# Patient Record
Sex: MALE | Race: OTHER | HISPANIC OR LATINO | URBAN - METROPOLITAN AREA
[De-identification: names, ages, dates, MRNs, and addresses within clinical notes are randomized per-mention and may not be internally consistent; named-entity substitution may affect disease eponyms.]

---

## 2021-04-20 ENCOUNTER — EMERGENCY (EMERGENCY)
Facility: HOSPITAL | Age: 51
LOS: 1 days | Discharge: HOME | End: 2021-04-20
Attending: EMERGENCY MEDICINE
Payer: COMMERCIAL

## 2021-04-20 VITALS
DIASTOLIC BLOOD PRESSURE: 91 MMHG | RESPIRATION RATE: 18 BRPM | HEART RATE: 74 BPM | OXYGEN SATURATION: 99 % | SYSTOLIC BLOOD PRESSURE: 133 MMHG | TEMPERATURE: 98 F

## 2021-04-20 VITALS
SYSTOLIC BLOOD PRESSURE: 152 MMHG | OXYGEN SATURATION: 100 % | DIASTOLIC BLOOD PRESSURE: 103 MMHG | HEART RATE: 103 BPM | TEMPERATURE: 98 F | RESPIRATION RATE: 20 BRPM | HEIGHT: 65 IN

## 2021-04-20 DIAGNOSIS — Z93.8 OTHER ARTIFICIAL OPENING STATUS: Chronic | ICD-10-CM

## 2021-04-20 DIAGNOSIS — Z98.890 OTHER SPECIFIED POSTPROCEDURAL STATES: Chronic | ICD-10-CM

## 2021-04-20 LAB
ALBUMIN SERPL ELPH-MCNC: 4.3 G/DL — SIGNIFICANT CHANGE UP (ref 3.3–5.2)
ALP SERPL-CCNC: 120 U/L — SIGNIFICANT CHANGE UP (ref 40–120)
ALT FLD-CCNC: 28 U/L — SIGNIFICANT CHANGE UP
ANION GAP SERPL CALC-SCNC: 11 MMOL/L — SIGNIFICANT CHANGE UP (ref 5–17)
APTT BLD: 33 SEC — SIGNIFICANT CHANGE UP (ref 27.5–35.5)
AST SERPL-CCNC: 20 U/L — SIGNIFICANT CHANGE UP
BASOPHILS # BLD AUTO: 0.04 K/UL — SIGNIFICANT CHANGE UP (ref 0–0.2)
BASOPHILS NFR BLD AUTO: 0.5 % — SIGNIFICANT CHANGE UP (ref 0–2)
BILIRUB SERPL-MCNC: 0.3 MG/DL — LOW (ref 0.4–2)
BUN SERPL-MCNC: 11 MG/DL — SIGNIFICANT CHANGE UP (ref 8–20)
CALCIUM SERPL-MCNC: 9.2 MG/DL — SIGNIFICANT CHANGE UP (ref 8.6–10.2)
CHLORIDE SERPL-SCNC: 102 MMOL/L — SIGNIFICANT CHANGE UP (ref 98–107)
CO2 SERPL-SCNC: 26 MMOL/L — SIGNIFICANT CHANGE UP (ref 22–29)
CREAT SERPL-MCNC: 1.02 MG/DL — SIGNIFICANT CHANGE UP (ref 0.5–1.3)
EOSINOPHIL # BLD AUTO: 0.13 K/UL — SIGNIFICANT CHANGE UP (ref 0–0.5)
EOSINOPHIL NFR BLD AUTO: 1.5 % — SIGNIFICANT CHANGE UP (ref 0–6)
GLUCOSE SERPL-MCNC: 114 MG/DL — HIGH (ref 70–99)
HCT VFR BLD CALC: 47.6 % — SIGNIFICANT CHANGE UP (ref 39–50)
HGB BLD-MCNC: 16.4 G/DL — SIGNIFICANT CHANGE UP (ref 13–17)
IMM GRANULOCYTES NFR BLD AUTO: 1.2 % — SIGNIFICANT CHANGE UP (ref 0–1.5)
INR BLD: 1.09 RATIO — SIGNIFICANT CHANGE UP (ref 0.88–1.16)
LYMPHOCYTES # BLD AUTO: 2.57 K/UL — SIGNIFICANT CHANGE UP (ref 1–3.3)
LYMPHOCYTES # BLD AUTO: 30.5 % — SIGNIFICANT CHANGE UP (ref 13–44)
MCHC RBC-ENTMCNC: 29.9 PG — SIGNIFICANT CHANGE UP (ref 27–34)
MCHC RBC-ENTMCNC: 34.5 GM/DL — SIGNIFICANT CHANGE UP (ref 32–36)
MCV RBC AUTO: 86.9 FL — SIGNIFICANT CHANGE UP (ref 80–100)
MONOCYTES # BLD AUTO: 0.73 K/UL — SIGNIFICANT CHANGE UP (ref 0–0.9)
MONOCYTES NFR BLD AUTO: 8.7 % — SIGNIFICANT CHANGE UP (ref 2–14)
NEUTROPHILS # BLD AUTO: 4.86 K/UL — SIGNIFICANT CHANGE UP (ref 1.8–7.4)
NEUTROPHILS NFR BLD AUTO: 57.6 % — SIGNIFICANT CHANGE UP (ref 43–77)
PLATELET # BLD AUTO: 260 K/UL — SIGNIFICANT CHANGE UP (ref 150–400)
POTASSIUM SERPL-MCNC: 3.8 MMOL/L — SIGNIFICANT CHANGE UP (ref 3.5–5.3)
POTASSIUM SERPL-SCNC: 3.8 MMOL/L — SIGNIFICANT CHANGE UP (ref 3.5–5.3)
PROT SERPL-MCNC: 7.7 G/DL — SIGNIFICANT CHANGE UP (ref 6.6–8.7)
PROTHROM AB SERPL-ACNC: 12.6 SEC — SIGNIFICANT CHANGE UP (ref 10.6–13.6)
RBC # BLD: 5.48 M/UL — SIGNIFICANT CHANGE UP (ref 4.2–5.8)
RBC # FLD: 12.5 % — SIGNIFICANT CHANGE UP (ref 10.3–14.5)
SODIUM SERPL-SCNC: 139 MMOL/L — SIGNIFICANT CHANGE UP (ref 135–145)
TROPONIN T SERPL-MCNC: <0.01 NG/ML — SIGNIFICANT CHANGE UP (ref 0–0.06)
WBC # BLD: 8.43 K/UL — SIGNIFICANT CHANGE UP (ref 3.8–10.5)
WBC # FLD AUTO: 8.43 K/UL — SIGNIFICANT CHANGE UP (ref 3.8–10.5)

## 2021-04-20 PROCEDURE — 0042T: CPT

## 2021-04-20 PROCEDURE — 70498 CT ANGIOGRAPHY NECK: CPT | Mod: 26,MA

## 2021-04-20 PROCEDURE — 85730 THROMBOPLASTIN TIME PARTIAL: CPT

## 2021-04-20 PROCEDURE — 70496 CT ANGIOGRAPHY HEAD: CPT

## 2021-04-20 PROCEDURE — 82962 GLUCOSE BLOOD TEST: CPT

## 2021-04-20 PROCEDURE — 70450 CT HEAD/BRAIN W/O DYE: CPT

## 2021-04-20 PROCEDURE — 96374 THER/PROPH/DIAG INJ IV PUSH: CPT | Mod: XU

## 2021-04-20 PROCEDURE — 93010 ELECTROCARDIOGRAM REPORT: CPT

## 2021-04-20 PROCEDURE — 85025 COMPLETE CBC W/AUTO DIFF WBC: CPT

## 2021-04-20 PROCEDURE — 70450 CT HEAD/BRAIN W/O DYE: CPT | Mod: 26,59,MA

## 2021-04-20 PROCEDURE — 36415 COLL VENOUS BLD VENIPUNCTURE: CPT

## 2021-04-20 PROCEDURE — 99285 EMERGENCY DEPT VISIT HI MDM: CPT

## 2021-04-20 PROCEDURE — 85610 PROTHROMBIN TIME: CPT

## 2021-04-20 PROCEDURE — 80053 COMPREHEN METABOLIC PANEL: CPT

## 2021-04-20 PROCEDURE — 70498 CT ANGIOGRAPHY NECK: CPT

## 2021-04-20 PROCEDURE — 99285 EMERGENCY DEPT VISIT HI MDM: CPT | Mod: 25

## 2021-04-20 PROCEDURE — 70496 CT ANGIOGRAPHY HEAD: CPT | Mod: 26,59,MA

## 2021-04-20 PROCEDURE — 93005 ELECTROCARDIOGRAM TRACING: CPT

## 2021-04-20 PROCEDURE — 84484 ASSAY OF TROPONIN QUANT: CPT

## 2021-04-20 RX ORDER — METHOCARBAMOL 500 MG/1
2 TABLET, FILM COATED ORAL
Qty: 20 | Refills: 0
Start: 2021-04-20

## 2021-04-20 RX ORDER — METHOCARBAMOL 500 MG/1
1500 TABLET, FILM COATED ORAL ONCE
Refills: 0 | Status: COMPLETED | OUTPATIENT
Start: 2021-04-20 | End: 2021-04-20

## 2021-04-20 RX ORDER — IBUPROFEN 200 MG
1 TABLET ORAL
Qty: 20 | Refills: 0
Start: 2021-04-20

## 2021-04-20 RX ORDER — KETOROLAC TROMETHAMINE 30 MG/ML
15 SYRINGE (ML) INJECTION ONCE
Refills: 0 | Status: DISCONTINUED | OUTPATIENT
Start: 2021-04-20 | End: 2021-04-20

## 2021-04-20 RX ADMIN — Medication 15 MILLIGRAM(S): at 02:53

## 2021-04-20 RX ADMIN — METHOCARBAMOL 1500 MILLIGRAM(S): 500 TABLET, FILM COATED ORAL at 02:53

## 2021-04-20 NOTE — ED ADULT TRIAGE NOTE - CHIEF COMPLAINT QUOTE
pt arrived c/o headache and left arm weakness. states this started around two hours ago when he was leaving [employer]. pt is aao x4. is moving all extremities with equal strength but states "it still feels weak" denies dizziness, blurry vision, chest pain sob. MD called to bedside. code stroke activated

## 2021-04-20 NOTE — ED PROVIDER NOTE - NS ED ROS FT
Constitutional: no fever, no chills  Eyes: no vision changes  ENT: no nasal congestion, no sore throat  CV: no chest pain  Resp: no cough, no shortness of breath  GI: no abdominal pain, no vomiting, no diarrhea  : no dysuria  MSK: no joint pain  Skin: no rash  Neuro: +headache, no weakness, +paresthesias

## 2021-04-20 NOTE — ED ADULT NURSE NOTE - CHIEF COMPLAINT QUOTE
pt arrived c/o headache and left arm weakness. states this started around two hours ago when he was leaving [employer]. pt is aao x4. is moving all extremities with equal strength but states "it still feels weak" denies dizziness, blurry vision, chest pain sob. MD called to bedside. code stroke activated

## 2021-04-20 NOTE — ED PROVIDER NOTE - PATIENT PORTAL LINK FT
You can access the FollowMyHealth Patient Portal offered by [facility] by registering at the following website: [email or web address]. By joining [employer]’s FollowMyHealth portal, you will also be able to view your health information using other applications (apps) compatible with our system.

## 2021-04-20 NOTE — ED PROVIDER NOTE - PHYSICAL EXAMINATION
Constitutional: Awake, alert, in no acute distress  Eyes: no scleral icterus  HENT: normocephalic, atraumatic, moist oral mucosa  Neck: supple  CV: RRR, no murmur  Pulm: non-labored respirations, CTAB  Abdomen: soft, non-tender, non-distended  Extremities: no edema, no deformity  Skin: no rash, no jaundice  Neuro: AAOx3, CNs II-XII intact, no facial asymmetry, 5/5 strength in all extremities, +mildly decreased sensation LUE, no finger-to-nose dysmetria.  NIHSS = 1. Constitutional: Awake, alert, in no acute distress.  Symptoms exacerbated when turning head to the right.  Eyes: no scleral icterus  HENT: normocephalic, atraumatic, moist oral mucosa  Neck: supple, no tenderness  CV: RRR, no murmur  Pulm: non-labored respirations, CTAB  Abdomen: soft, non-tender, non-distended  Extremities: no edema, no deformity  Skin: no rash, no jaundice  Neuro: AAOx3, CNs II-XII intact, no facial asymmetry, 5/5 strength in all extremities, +mildly decreased sensation LUE, no finger-to-nose dysmetria.  NIHSS = 1.

## 2021-04-20 NOTE — ED ADULT NURSE NOTE - OBJECTIVE STATEMENT
Pt in no apparent distress at this time. Airway patent, breathing spontaneous and nonlabored. Pt A&Ox3 resting in stretcher. Pt c/o   left sided headache and left arm numbness starting at 2100. no other deficits noted.

## 2021-04-20 NOTE — ED PROVIDER NOTE - CARE PLAN
Principal Discharge DX:	Neck pain on left side  Secondary Diagnosis:	Headache  Secondary Diagnosis:	Paresthesia of left arm   Principal Discharge DX:	Neck pain on left side  Secondary Diagnosis:	Paresthesia of left arm

## 2021-04-20 NOTE — ED PROVIDER NOTE - PROGRESS NOTE DETAILS
Joel: Diagnostics reviewed.  Will treat with toradol, robaxin, reassess. Joel: On reevaluation, pt resting comfortably, reports improvement of symptoms.  Stable for discharge home.

## 2021-04-20 NOTE — ED PROVIDER NOTE - OBJECTIVE STATEMENT
50y M w/ no significant PMH, presenting for headache.  Pt reports gradual onset of L occipitoparietal headache radiating to the neck at 9 PM tonight, associated with L arm paresthesias.  Symptoms started when pt was leaving [employer].  Denies vision changes, lower extremity weakness, dizziness, chest pain, SOB, difficulty walking.  Code Stroke activated on arrival.

## 2021-04-20 NOTE — ED PROVIDER NOTE - ATTENDING CONTRIBUTION TO CARE
Code stroke called on arrival due to headache and sensory change on the left arm, subjective weakness but no objective deficit on exam. CT/CTA/CTP all with no acute findings. Suspect this is likely muscular or possibly radicular pain coming the neck. Improved with pain control, no residual deficit warranting MR.

## 2021-04-20 NOTE — ED ADULT NURSE NOTE - NSIMPLEMENTINTERV_GEN_ALL_ED
Implemented All Universal Safety Interventions:  [place] to call system. Call bell, personal items and telephone within reach. Instruct patient to call for assistance. Room bathroom lighting operational. Non-slip footwear when patient is off stretcher. Physically safe environment: no spills, clutter or unnecessary equipment. Stretcher in lowest position, wheels locked, appropriate side rails in place.

## 2021-04-20 NOTE — ED PROVIDER NOTE - CLINICAL SUMMARY MEDICAL DECISION MAKING FREE TEXT BOX
50y M w/ no significant PMH, presenting for headache and LUE paresthesias.  Code Stroke activated on arrival.  Will obtain CT/CTA/CT perfusion.  NIHSS of 1.  Pt just outside tPA window.  Possible component of cervical radiculopathy.  Will obtain labs, EKG, reassess. 50y M w/ no significant PMH, presenting for headache and LUE paresthesias.  Code Stroke activated on arrival.  Will obtain CT/CTA/CT perfusion.  NIHSS of 1.  Pt just outside tPA window.  Possible component of cervical radiculopathy.  Pt declining analgesics.  Will obtain labs, EKG, reassess. 50y M w/ no significant PMH, presenting for headache/neck pain and LUE paresthesias.  Code Stroke activated on arrival.  Will obtain CT/CTA/CT perfusion.  NIHSS of 1.  Pt just outside tPA window.  Likely component of cervical radiculopathy.  Will obtain labs, EKG, reassess.

## 2021-04-20 NOTE — ED PROVIDER NOTE - NSFOLLOWUPINSTRUCTIONS_ED_ALL_ED_FT
Radiculopatía cervical    LO QUE NECESITA SABER:    ¿Qué es la radiculopatía cervical?La radiculopatía cervical es drake condición muy dolorosa que sucede cuando se oprime o irrita marilu de los nervios de la columna vertebral.     [place] vertebral         ¿Cuál es la causa de radiculopatía cervical?Los cambios en las vértebras (huesos) y discos del gato pueden aplicar presión en los nervios de la colum[place] vertebral. Los discos son unos cojines esponjosos naturales que se encuentran entre las vértebras y permiten el movimiento de la columna. Lo siguiente puede provocar que se oprima un nervio:  •Un disco lesionadopodría ocurrir si un disco se aplana, se sale o mueve con el tiempo. Drake lesión puede también provocar un daño en el disco.      •La espondilosis cervicales cuando la vértebra del gato se rompe. [place] normalmente ocurre conforme se avanza de edad.      •Los crecimientoscomo los tumores o quistes (bultos llenos de líquido) podrían desarrollarse y presionar el nervio.      ¿Cuáles son los signos y síntomas de radiculopatía cervical?El síntoma mas común es un dolor carson que va desde el gato hasta el brazo. Podría presentar dolor en el hombro, el pecho y la mano. El dolor podría empeorar con el movimiento o cuando tose o estornuda. Usted también podría presentar alguno de los siguientes:   •Sensación de ardor y hormigueo en el gato o brazo      •Adormecimiento o debilidad en el brazo o la mano que le dificulta agarrar objetos      •Raina de myrna      ¿Cómo se diagnostica la radiculopatía cervical?El médico le preguntará cómo y cuándo comenzaron los síntomas. Él le va a presionar el gato con cuidado para revisar si hay sensibilidad y áreas que no tienen la forma correcta. También va a revisarle los brazos y jennifer para nadeem si hay adormecimiento o debilidad. Puede presentar cualquiera de los siguientes signos o síntomas:   •Los exámenes de provocaciónse realizan para revisar la respuesta a ciertos movimientos. El médico le pedirá que mueva el gato, hombros y brazos de diferentes formas. Algunos movimientos aumentaran los síntomas, mientras que otros lo harán sentirse mejor.      •Drake radiografíaes drake imagen de los huesos y los tejidos en el gato.      •Imagen por resonancia magnética o tomografía computarizadapodrían utilizarse para anali imágenes del gato. Las imágenes pueden mostrar problemas y cambios en los nervios, discos y vértebras. Le podrían administrar un tinte para ayudar a que las imágenes se vean mejor. Dígale al médico si usted alguna vez ha tenido drake reacción alérgica al tinte de contraste. No entre a la shirley donde se realiza la resonancia magnética con algo de metal. El metal puede causar lesiones serias. Dígale al médico si usted tiene algo de metal dentro de love cuerpo o por encima.      •A drake electromiografíatambién se le conoce kaur EMG. Drake EMG se realiza para examinar la función de los músculos y de los nervios que los controlan. Se colocan electrodos (cables) en el músculo que se va a examinar. Podrían conectarse agujas a los electrodos y colocarse en la piel. La actividad eléctrica de los músculos y nervios se mide por drake máquina que se conecta a los electrodos. Los músculos se examinan mientras están en descanso y en movimiento.      ¿Cuál es el tratamiento para la radiculopatía cervical?  •Los ISSAC,disminuyen la inflamación y el dolor. Nena medicamento puede comprarse sin receta médica. Nena medicamento puede causar sangrado estomacal o problemas en los riñones en ciertas personas. Si usted amarilys un medicamento anticoagulante, asegúrese de preguntarle a love médico si los ISSAC son seguros para usted. Codie siempre la etiqueta y siga cuidadosamente las instrucciones antes de usar nena medicamento.      •Puede administrarsese pueden administrar para disminuir el dolor. No espere hasta que el dolor sea severo antes de anali nena medicamento.      •Esteroidescontribuyen a aliviar el dolor y bajar la inflamación. Estos podrían administrarse kaur píldora o kaur inyección en el gato. Es posible que usted necesite mas de 1 inyección si los síntomas no mejoran después del primer tratamiento.      •La fisioterapiaayuda a estirar y fortalecer los músculos. El fisioterapeuta puede enseñarle a mejorar la postura y la forma de sostener el gato. También le podría enseñar a estar activo de drake forma ford y a evitar lesiones futuras. Además le puede ayudar a desarrollar un plan de ejercicios que sea seguro para la espalda y el gato.      •La cirugíapodría utilizarse para tratar al nervio que está oprimido si los otros tratamientos no funcionan después de 6 a 12 meses.      ¿Cómo puedo controlar los síntomas?  •El hieloayuda a disminuir la inflamación y el dolor. El hielo también puede contribuir a evitar el daño de los tejidos. Use drake compresa de hielo o ponga hielo triturado en drake bolsa de plástico. Cúbrala con drake toalla y colóquela en el gato por 15 a 20 minutos cada hora o kaur se le indique.      •El descansocuando sienta que es necesario. Empiece a hacer un poco más día a día. Regrese a radha actividades diarias kaur se le haya indicado.      •Use un collarín suave.Es posible que le den un collarín suave para sostener el gato mientras duerme. Use el collarín solamente kaur se lo indiquen.   Collares cervicales           •Realice estiramientos suaves y ejercicio regularmente.El médico podría recomendarle que ricky ejercicios de elongación suaves para ayudar a disminuir la rigidez en el gato y brazo mientras se recupera. Después de controlar el dolor, usted se podría beneficiar de hacer ejercicio a diario. Pregúntele que clase de ejercicios son seguros para la espalda y el gato.      •Revise el área de trabajo.Un área de trabajo cómoda puede ayudarle a evitar tensión en el gato. Pregúntele a love empleador que realice drake revisión ergonómica para revisar la posición del escritorio, silla, teléfono y computadora. Ricky cualquier ajuste necesario para love comodidad.      ¿Cuándo amandeep comunicarme con mi médico?  •Usted pierde peso sin proponérselo.      •El dolor es peor, aún con el medicamento.      •Drake o las dos jennifer se sienten más adormecidas que antes, o no puede  los dedos del todo.      •Usted tiene preguntas o inquietudes acerca de love condición o cuidado.      ACUERDOS SOBRE LOVE CUIDADO:    Usted tiene el derecho de ayudar a planear love cuidado. Aprenda todo lo que pueda sobre love condición y kaur darle tratamiento. Discuta radha opciones de tratamiento con radha médicos para decidir el cuidado que usted desea recibir. Usted siempre tiene el derecho de rechazar el tratamiento. - Luciano un seguimiento con love médico de atención primaria.     - Evite conducir u operar maquinaria pesada después de anali relajante muscular.     - Regrese a la shirley de emergencias por cualquier síntoma nuevo o que empeore, incluidos cambios en la visión, dolor de myrna intenso, debilidad, dificultad para hablar / tragar, dificultad para caminar.    --------------------------------------------    Radiculopatía cervical    LO QUE NECESITA SABER:    ¿Qué es la radiculopatía cervical?La radiculopatía cervical es drake condición muy dolorosa que sucede cuando se oprime o irrita marilu de los nervios de la columna vertebral.     [place] vertebral         ¿Cuál es la causa de radiculopatía cervical?Los cambios en las vértebras (huesos) y discos del gato pueden aplicar presión en los nervios de la columna vertebral. Los discos son unos cojines esponjosos naturales que se encuentran entre las vértebras y permiten el movimiento de la columna. Lo siguiente puede provocar que se oprima un nervio:  •Un disco lesionadopodría ocurrir si un disco se aplana, se sale o mueve con el tiempo. Drake lesión puede también provocar un daño en el disco.      •La espondilosis cervicales cuando la vértebra del gato se rompe. [place] normalmente ocurre conforme se avanza de edad.      •Los crecimientoscomo los tumores o quistes (bultos llenos de líquido) podrían desarrollarse y presionar el nervio.      ¿Cuáles son los signos y síntomas de radiculopatía cervical?El síntoma mas común es un dolor carson que va desde el gato hasta el brazo. Podría presentar dolor en el hombro, el pecho y la mano. El dolor podría empeorar con el movimiento o cuando tose o estornuda. Usted también podría presentar alguno de los siguientes:   •Sensación de ardor y hormigueo en el gato o brazo      •Adormecimiento o debilidad en el brazo o la mano que le dificulta agarrar objetos      •Raina de myrna      ¿Cómo se diagnostica la radiculopatía cervical?El médico le preguntará cómo y cuándo comenzaron los síntomas. Él le va a presionar el gato con cuidado para revisar si hay sensibilidad y áreas que no tienen la forma correcta. También va a revisarle los brazos y jennifer para nadeem si hay adormecimiento o debilidad. Puede presentar cualquiera de los siguientes signos o síntomas:   •Los exámenes de provocaciónse realizan para revisar la respuesta a ciertos movimientos. El médico le pedirá que mueva el gato, hombros y brazos de diferentes formas. Algunos movimientos aumentaran los síntomas, mientras que otros lo harán sentirse mejor.      •Drake radiografíaes drake imagen de los huesos y los tejidos en el gato.      •Imagen por resonancia magnética o tomografía computarizadapodrían utilizarse para anali imágenes del gato. Las imágenes pueden mostrar problemas y cambios en los nervios, discos y vértebras. Le podrían administrar un tinte para ayudar a que las imágenes se vean mejor. Dígale al médico si usted alguna vez ha tenido drake reacción alérgica al tinte de contraste. No entre a la shirley donde se realiza la resonancia magnética con algo de metal. El metal puede causar lesiones serias. Dígale al médico si usted tiene algo de metal dentro de love cuerpo o por encima.      •A drake electromiografíatambién se le conoce kaur EMG. Drake EMG se realiza para examinar la función de los músculos y de los nervios que los controlan. Se colocan electrodos (cables) en el músculo que se va a examinar. Podrían conectarse agujas a los electrodos y colocarse en la piel. La actividad eléctrica de los músculos y nervios se mide por drake máquina que se conecta a los electrodos. Los músculos se examinan mientras están en descanso y en movimiento.      ¿Cuál es el tratamiento para la radiculopatía cervical?  •Los ISSAC,disminuyen la inflamación y el dolor. Nena medicamento puede comprarse sin receta médica. Nena medicamento puede causar sangrado estomacal o problemas en los riñones en ciertas personas. Si usted amarilys un medicamento anticoagulante, asegúrese de preguntarle a love médico si los ISSAC son seguros para usted. Codie siempre la etiqueta y siga cuidadosamente las instrucciones antes de usar nena medicamento.      •Puede administrarsese pueden administrar para disminuir el dolor. No espere hasta que el dolor sea severo antes de anali nena medicamento.      •Esteroidescontribuyen a aliviar el dolor y bajar la inflamación. Estos podrían administrarse kaur píldora o kaur inyección en el gato. Es posible que usted necesite mas de 1 inyección si los síntomas no mejoran después del primer tratamiento.      •La fisioterapiaayuda a estirar y fortalecer los músculos. El fisioterapeuta puede enseñarle a mejorar la postura y la forma de sostener el gato. También le podría enseñar a estar activo de drake forma ford y a evitar lesiones futuras. Además le puede ayudar a desarrollar un plan de ejercicios que sea seguro para la espalda y el gato.      •La cirugíapodría utilizarse para tratar al nervio que está oprimido si los otros tratamientos no funcionan después de 6 a 12 meses.      ¿Cómo puedo controlar los síntomas?  •El hieloayuda a disminuir la inflamación y el dolor. El hielo también puede contribuir a evitar el daño de los tejidos. Use drake compresa de hielo o ponga hielo triturado en drake bolsa de plástico. Cúbrala con drake toalla y colóquela en el gato por 15 a 20 minutos cada hora o kaur se le indique.      •El descansocuando sienta que es necesario. Empiece a hacer un poco más día a día. Regrese a radha actividades diarias kaur se le haya indicado.      •Use un collarín suave.Es posible que le den un collarín suave para sostener el gato mientras duerme. Use el collarín solamente kaur se lo indiquen.   Collares cervicales           •Realice estiramientos suaves y ejercicio regularmente.El médico podría recomendarle que luciano ejercicios de elongación suaves para ayudar a disminuir la rigidez en el gato y brazo mientras se recupera. Después de controlar el dolor, usted se podría beneficiar de hacer ejercicio a diario. Pregúntele que clase de ejercicios son seguros para la espalda y el gato.      •Revise el área de trabajo.Un área de trabajo cómoda puede ayudarle a evitar tensión en el gato. Pregúntele a love empleador que realice drake revisión ergonómica para revisar la posición del escritorio, silla, teléfono y computadora. Luciano cualquier ajuste necesario para love comodidad.      ¿Cuándo amandeep comunicarme con mi médico?  •Usted pierde peso sin proponérselo.      •El dolor es peor, aún con el medicamento.      •Drake o las dos jennifer se sienten más adormecidas que antes, o no puede  los dedos del todo.      •Usted tiene preguntas o inquietudes acerca de love condición o cuidado.      ACUERDOS SOBRE LOVE CUIDADO:    Usted tiene el derecho de ayudar a planear love cuidado. Aprenda todo lo que pueda sobre love condición y kaur darle tratamiento. Discuta radha opciones de tratamiento con radha médicos para decidir el cuidado que usted desea recibir. Usted siempre tiene el derecho de rechazar el tratamiento.

## 2023-05-18 ENCOUNTER — OFFICE (OUTPATIENT)
Dept: URBAN - METROPOLITAN AREA CLINIC 94 | Facility: CLINIC | Age: 53
Setting detail: OPHTHALMOLOGY
End: 2023-05-18
Payer: COMMERCIAL

## 2023-05-18 DIAGNOSIS — H11.042: ICD-10-CM

## 2023-05-18 PROBLEM — H11.151 PINGUECULA; RIGHT EYE: Status: ACTIVE | Noted: 2023-05-18

## 2023-05-18 PROCEDURE — 92014 COMPRE OPH EXAM EST PT 1/>: CPT | Performed by: OPHTHALMOLOGY

## 2023-05-18 ASSESSMENT — REFRACTION_MANIFEST
OD_ADD: +1.75
OD_VA1: 20/20
OS_SPHERE: PL
OD_SPHERE: PL
OS_ADD: +1.75
OS_VA1: 20/20

## 2023-05-18 ASSESSMENT — CONFRONTATIONAL VISUAL FIELD TEST (CVF)
OD_FINDINGS: FULL
OS_FINDINGS: FULL

## 2023-05-18 ASSESSMENT — KERATOMETRY
OS_AXISANGLE_DEGREES: 055
OD_K2POWER_DIOPTERS: 45.25
OS_K2POWER_DIOPTERS: 45.50
OS_K1POWER_DIOPTERS: 45.25
OD_K1POWER_DIOPTERS: 45.00
OD_AXISANGLE_DEGREES: 097

## 2023-05-18 ASSESSMENT — TONOMETRY
OS_IOP_MMHG: 17
OD_IOP_MMHG: 17

## 2023-05-18 ASSESSMENT — CORNEAL PTERYGIUM: OS_PTERYGIUM: NASAL 2MM 1MM

## 2023-05-18 ASSESSMENT — VISUAL ACUITY
OD_BCVA: 20/20
OS_BCVA: 20/20

## 2023-05-18 ASSESSMENT — REFRACTION_AUTOREFRACTION
OD_AXIS: 072
OS_SPHERE: PLANO
OD_SPHERE: PLANO
OS_AXIS: 101
OD_CYLINDER: -0.25
OS_CYLINDER: -0.25

## 2024-05-01 NOTE — ED ADULT NURSE NOTE - NS ED NURSE DC INFO COMPLEXITY
Pt was on cruise last week and was vomiting and unable to eat most of the time. Today he c/o high blood pressure and dehydration. Pt states he has had GI problems for 6 months. Pt got a b12 shot today.   Simple: Patient demonstrates quick and easy understanding